# Patient Record
Sex: FEMALE | Race: WHITE | NOT HISPANIC OR LATINO | Employment: OTHER | URBAN - NONMETROPOLITAN AREA
[De-identification: names, ages, dates, MRNs, and addresses within clinical notes are randomized per-mention and may not be internally consistent; named-entity substitution may affect disease eponyms.]

---

## 2023-03-18 DIAGNOSIS — I10 PRIMARY HYPERTENSION: Primary | ICD-10-CM

## 2023-03-21 RX ORDER — HYDROCHLOROTHIAZIDE 12.5 MG/1
TABLET ORAL
Qty: 90 TABLET | Refills: 3 | Status: SHIPPED | OUTPATIENT
Start: 2023-03-21 | End: 2024-01-18 | Stop reason: SDUPTHER

## 2023-07-11 PROBLEM — I10 HYPERTENSION, ESSENTIAL, BENIGN: Status: ACTIVE | Noted: 2023-07-11

## 2023-07-11 PROBLEM — R12 HEARTBURN: Status: ACTIVE | Noted: 2023-07-11

## 2023-07-11 PROBLEM — E78.00 HYPERCHOLESTEROLEMIA: Status: ACTIVE | Noted: 2023-07-11

## 2023-07-11 PROBLEM — M81.0 OSTEOPOROSIS: Status: ACTIVE | Noted: 2023-07-11

## 2023-07-11 PROBLEM — R00.1 BRADYCARDIA: Status: ACTIVE | Noted: 2023-07-11

## 2023-07-11 RX ORDER — MULTIVIT-MIN/FERROUS FUMARATE 15 MG
1 TABLET ORAL DAILY
COMMUNITY
Start: 2020-12-17

## 2023-07-11 RX ORDER — PITAVASTATIN MAGNESIUM 2 MG/1
1 TABLET, FILM COATED ORAL DAILY
COMMUNITY
Start: 2022-07-08 | End: 2023-07-13 | Stop reason: WASHOUT

## 2023-07-11 RX ORDER — DENOSUMAB 60 MG/ML
INJECTION SUBCUTANEOUS
COMMUNITY
Start: 2020-01-17

## 2023-07-11 RX ORDER — EVOLOCUMAB 140 MG/ML
140 INJECTION, SOLUTION SUBCUTANEOUS
COMMUNITY
Start: 2023-06-24 | End: 2024-01-22

## 2023-07-11 RX ORDER — LISINOPRIL 20 MG/1
1 TABLET ORAL DAILY
COMMUNITY
Start: 2020-01-17 | End: 2023-12-22 | Stop reason: SDUPTHER

## 2023-07-13 ENCOUNTER — OFFICE VISIT (OUTPATIENT)
Dept: PRIMARY CARE | Facility: CLINIC | Age: 68
End: 2023-07-13
Payer: MEDICARE

## 2023-07-13 VITALS
OXYGEN SATURATION: 96 % | DIASTOLIC BLOOD PRESSURE: 80 MMHG | SYSTOLIC BLOOD PRESSURE: 130 MMHG | HEIGHT: 62 IN | HEART RATE: 59 BPM | BODY MASS INDEX: 28.93 KG/M2 | WEIGHT: 157.2 LBS

## 2023-07-13 DIAGNOSIS — R53.82 CHRONIC FATIGUE: ICD-10-CM

## 2023-07-13 DIAGNOSIS — Z12.31 BREAST CANCER SCREENING BY MAMMOGRAM: ICD-10-CM

## 2023-07-13 DIAGNOSIS — M81.0 AGE-RELATED OSTEOPOROSIS WITHOUT CURRENT PATHOLOGICAL FRACTURE: ICD-10-CM

## 2023-07-13 DIAGNOSIS — I10 HYPERTENSION, ESSENTIAL, BENIGN: ICD-10-CM

## 2023-07-13 DIAGNOSIS — E78.00 HYPERCHOLESTEROLEMIA: Primary | ICD-10-CM

## 2023-07-13 PROCEDURE — 1159F MED LIST DOCD IN RCRD: CPT | Performed by: FAMILY MEDICINE

## 2023-07-13 PROCEDURE — 1160F RVW MEDS BY RX/DR IN RCRD: CPT | Performed by: FAMILY MEDICINE

## 2023-07-13 PROCEDURE — 3075F SYST BP GE 130 - 139MM HG: CPT | Performed by: FAMILY MEDICINE

## 2023-07-13 PROCEDURE — 3079F DIAST BP 80-89 MM HG: CPT | Performed by: FAMILY MEDICINE

## 2023-07-13 PROCEDURE — 99214 OFFICE O/P EST MOD 30 MIN: CPT | Performed by: FAMILY MEDICINE

## 2023-07-13 PROCEDURE — 1036F TOBACCO NON-USER: CPT | Performed by: FAMILY MEDICINE

## 2023-07-13 ASSESSMENT — ENCOUNTER SYMPTOMS
OCCASIONAL FEELINGS OF UNSTEADINESS: 0
DIARRHEA: 0
CONSTIPATION: 0
NUMBNESS: 0
TROUBLE SWALLOWING: 0
LIGHT-HEADEDNESS: 0
NAUSEA: 0
UNEXPECTED WEIGHT CHANGE: 0
SHORTNESS OF BREATH: 0
APPETITE CHANGE: 0
DIZZINESS: 0
PALPITATIONS: 0
ACTIVITY CHANGE: 0
DEPRESSION: 0
COUGH: 0
ADENOPATHY: 0
WHEEZING: 0
LOSS OF SENSATION IN FEET: 0
FATIGUE: 1
ARTHRALGIAS: 0
ABDOMINAL PAIN: 0
DIFFICULTY URINATING: 0
VOMITING: 0
HEADACHES: 0
RHINORRHEA: 0
ABDOMINAL DISTENTION: 0

## 2023-07-13 ASSESSMENT — PATIENT HEALTH QUESTIONNAIRE - PHQ9
1. LITTLE INTEREST OR PLEASURE IN DOING THINGS: NOT AT ALL
SUM OF ALL RESPONSES TO PHQ9 QUESTIONS 1 AND 2: 0
2. FEELING DOWN, DEPRESSED OR HOPELESS: NOT AT ALL

## 2023-07-13 NOTE — ASSESSMENT & PLAN NOTE
Blood pressure under good control renal function stable, no changes medication.   Call to patient.  Informed of Dr. Rivas's recommendation for Renal Ultrasound.  Patient to continue comfort measures including Tylenol and heat packs.     Allyson GUDINO RN  For Dr. Rivas (on call for Caren)

## 2023-07-13 NOTE — PROGRESS NOTES
"Subjective   Patient ID: Paige Denson is a 67 y.o. female who presents for 6 MO LABS.    HPI   No headache, chest pain, shortness of breath, dizziness, lightheadedness, or edema  Chronic fatigue, tired after injection of Repatha  Had Prolia at infusion center, last injection in May    Review of Systems   Constitutional:  Positive for fatigue. Negative for activity change, appetite change and unexpected weight change.   HENT:  Negative for ear pain, nosebleeds, rhinorrhea, sneezing and trouble swallowing.    Respiratory:  Negative for cough, shortness of breath and wheezing.    Cardiovascular:  Negative for chest pain, palpitations and leg swelling.   Gastrointestinal:  Negative for abdominal distention, abdominal pain, constipation, diarrhea, nausea and vomiting.   Genitourinary:  Negative for difficulty urinating.   Musculoskeletal:  Negative for arthralgias.   Skin:  Negative for rash.   Neurological:  Negative for dizziness, light-headedness, numbness and headaches.   Hematological:  Negative for adenopathy.   Psychiatric/Behavioral:  Negative for behavioral problems.    All other systems reviewed and are negative.      Objective   /80   Pulse 59   Ht 1.575 m (5' 2\")   Wt 71.3 kg (157 lb 3.2 oz)   SpO2 96%   BMI 28.75 kg/m²     Physical Exam  Vitals and nursing note reviewed.   Constitutional:       Appearance: Normal appearance.   HENT:      Head: Normocephalic and atraumatic.      Right Ear: Tympanic membrane, ear canal and external ear normal.      Left Ear: Tympanic membrane, ear canal and external ear normal.      Nose: Nose normal.      Mouth/Throat:      Mouth: Mucous membranes are moist.      Pharynx: Oropharynx is clear.   Cardiovascular:      Rate and Rhythm: Normal rate and regular rhythm.      Pulses: Normal pulses.      Heart sounds: Normal heart sounds.   Pulmonary:      Effort: Pulmonary effort is normal.      Breath sounds: Normal breath sounds.   Musculoskeletal:      Cervical " back: Normal range of motion and neck supple.   Neurological:      Mental Status: She is alert.   Psychiatric:         Mood and Affect: Mood normal.         Behavior: Behavior normal.         Assessment/Plan   Problem List Items Addressed This Visit       Hypercholesterolemia - Primary     Patient intolerant to multiple statins in the past, tolerating Repatha injections, LDL cholesterol now below 100.  Continue with current medication plan to recheck again in 6 months.         Relevant Orders    Follow Up In Primary Care - Established    Comprehensive Metabolic Panel    Lipid Panel    Hypertension, essential, benign     Blood pressure under good control renal function stable, no changes medication.         Relevant Orders    Follow Up In Primary Care - Established    Comprehensive Metabolic Panel    Osteoporosis     Tolerating Prolia infusions, will check calcium and vitamin D at next office visit.         Relevant Orders    Follow Up In Primary Care - Established    Vitamin D 25-Hydroxy,Total    XR DEXA bone density     Other Visit Diagnoses       Breast cancer screening by mammogram        Relevant Orders    Follow Up In Primary Care - Established    BI mammo bilateral screening tomosynthesis    Chronic fatigue        Check additional labs at next office visit, encouraged to be active.    Relevant Orders    Follow Up In Primary Care - Established    CBC    Comprehensive Metabolic Panel    Vitamin B12    Thyroid Stimulating Hormone    Vitamin D 25-Hydroxy,Total

## 2023-07-13 NOTE — ASSESSMENT & PLAN NOTE
Patient intolerant to multiple statins in the past, tolerating Repatha injections, LDL cholesterol now below 100.  Continue with current medication plan to recheck again in 6 months.

## 2023-10-11 ENCOUNTER — APPOINTMENT (OUTPATIENT)
Dept: RADIOLOGY | Facility: CLINIC | Age: 68
End: 2023-10-11
Payer: MEDICARE

## 2023-10-30 ENCOUNTER — OFFICE VISIT (OUTPATIENT)
Dept: PRIMARY CARE | Facility: CLINIC | Age: 68
End: 2023-10-30
Payer: MEDICARE

## 2023-10-30 VITALS
DIASTOLIC BLOOD PRESSURE: 68 MMHG | HEART RATE: 84 BPM | OXYGEN SATURATION: 97 % | SYSTOLIC BLOOD PRESSURE: 118 MMHG | BODY MASS INDEX: 29.21 KG/M2 | HEIGHT: 62 IN | WEIGHT: 158.7 LBS

## 2023-10-30 DIAGNOSIS — R10.32 LLQ PAIN: Primary | ICD-10-CM

## 2023-10-30 PROCEDURE — 99213 OFFICE O/P EST LOW 20 MIN: CPT | Performed by: NURSE PRACTITIONER

## 2023-10-30 PROCEDURE — 1036F TOBACCO NON-USER: CPT | Performed by: NURSE PRACTITIONER

## 2023-10-30 PROCEDURE — 1160F RVW MEDS BY RX/DR IN RCRD: CPT | Performed by: NURSE PRACTITIONER

## 2023-10-30 PROCEDURE — 3078F DIAST BP <80 MM HG: CPT | Performed by: NURSE PRACTITIONER

## 2023-10-30 PROCEDURE — 3074F SYST BP LT 130 MM HG: CPT | Performed by: NURSE PRACTITIONER

## 2023-10-30 PROCEDURE — 1159F MED LIST DOCD IN RCRD: CPT | Performed by: NURSE PRACTITIONER

## 2023-10-30 RX ORDER — ERGOCALCIFEROL 1.25 MG/1
CAPSULE ORAL
COMMUNITY
Start: 2020-01-17

## 2023-10-30 RX ORDER — AMOXICILLIN AND CLAVULANATE POTASSIUM 875; 125 MG/1; MG/1
875 TABLET, FILM COATED ORAL
Qty: 20 TABLET | Refills: 0 | Status: SHIPPED | OUTPATIENT
Start: 2023-10-30 | End: 2023-11-09

## 2023-10-30 ASSESSMENT — ENCOUNTER SYMPTOMS
BLOOD IN STOOL: 0
NAUSEA: 0
ANAL BLEEDING: 0
FATIGUE: 0
VOMITING: 0
ABDOMINAL PAIN: 1
RECTAL PAIN: 0
DIARRHEA: 0
CHILLS: 0
CONSTIPATION: 0
FEVER: 0
ABDOMINAL DISTENTION: 0

## 2023-10-30 ASSESSMENT — PATIENT HEALTH QUESTIONNAIRE - PHQ9
SUM OF ALL RESPONSES TO PHQ9 QUESTIONS 1 AND 2: 0
1. LITTLE INTEREST OR PLEASURE IN DOING THINGS: NOT AT ALL
2. FEELING DOWN, DEPRESSED OR HOPELESS: NOT AT ALL

## 2023-10-30 NOTE — PROGRESS NOTES
"Subjective   Patient ID: Radha Denson is a 68 y.o. female who presents for LLQ PAIN (X WEDNESDAY 10/25, DENIES URINARY SYMPTOMS ).    Radha comes to the office for c/o LLQ pain x5 D that is intermittent in nature and described as a pressure sensation.  She endorses no  symptoms.  No melena/nausea/vomiting/diarrhea/fever/chilling or constipation.  States bowels are moving as per their usual.  Appetite is well.  No food triggers.  States 8 greasy meal on the Sunday prior to the pain starting.  Last colonoscopy 2019 showed multiple small and large mouth diverticula in the sigmoid and ascending colon.  Repeat colonoscopy in 5 years.         Review of Systems   Constitutional:  Negative for chills, fatigue and fever.   Gastrointestinal:  Positive for abdominal pain. Negative for abdominal distention, anal bleeding, blood in stool, constipation, diarrhea, nausea, rectal pain and vomiting.       Objective   /68   Pulse 84   Ht 1.575 m (5' 2\")   Wt 72 kg (158 lb 11.2 oz)   SpO2 97%   BMI 29.03 kg/m²     Physical Exam  Vitals reviewed.   Constitutional:       General: She is not in acute distress.     Appearance: She is obese. She is not ill-appearing.   Cardiovascular:      Rate and Rhythm: Normal rate and regular rhythm.   Pulmonary:      Effort: Pulmonary effort is normal.      Breath sounds: Normal breath sounds.   Abdominal:      General: Abdomen is protuberant. Bowel sounds are decreased.      Palpations: Abdomen is soft.      Tenderness: There is abdominal tenderness in the left lower quadrant.   Neurological:      Mental Status: She is alert.   Psychiatric:         Behavior: Behavior is cooperative.         Assessment/Plan   Problem List Items Addressed This Visit             ICD-10-CM    LLQ pain - Primary R10.32    Relevant Medications    amoxicillin-pot clavulanate (Augmentin) 875-125 mg tablet     1. LLQ pain  amoxicillin-pot clavulanate (Augmentin) 875-125 mg tablet    Augmentin 1 tablet twice a day " x10 days; phone update on Wednesday

## 2023-11-01 ENCOUNTER — TELEPHONE (OUTPATIENT)
Dept: PRIMARY CARE | Facility: CLINIC | Age: 68
End: 2023-11-01
Payer: MEDICARE

## 2023-11-01 NOTE — TELEPHONE ENCOUNTER
Contacted patient for follow up, she states she is feeling much better still having slight pain with first void in morning. Instructed her to call back with any issues.

## 2023-11-01 NOTE — TELEPHONE ENCOUNTER
----- Message from WILLY Jurado sent at 11/1/2023  8:01 AM EDT -----  Please call & get an update on how patient is feeling. Seen on Mon for LLQ & started on antibiotic.  Thanks  daniel

## 2023-11-22 ENCOUNTER — TELEPHONE (OUTPATIENT)
Dept: PRIMARY CARE | Facility: CLINIC | Age: 68
End: 2023-11-22
Payer: MEDICARE

## 2023-11-22 DIAGNOSIS — R10.32 LLQ PAIN: Primary | ICD-10-CM

## 2023-11-22 NOTE — TELEPHONE ENCOUNTER
Patient called stating her side pain has come back after completing antibiotic, requesting more testing.   
Pt notified   
complains of pain/discomfort

## 2023-11-29 ENCOUNTER — TELEPHONE (OUTPATIENT)
Dept: PRIMARY CARE | Facility: CLINIC | Age: 68
End: 2023-11-29
Payer: MEDICARE

## 2023-11-29 NOTE — TELEPHONE ENCOUNTER
----- Message from WILLY Jurado sent at 11/29/2023  1:00 PM EST -----  Regarding: lab results  Please call patient that her blood work is normal.

## 2023-12-06 ENCOUNTER — HOSPITAL ENCOUNTER (OUTPATIENT)
Dept: RADIOLOGY | Facility: HOSPITAL | Age: 68
Discharge: HOME | End: 2023-12-06
Payer: MEDICARE

## 2023-12-06 DIAGNOSIS — R10.32 LLQ PAIN: ICD-10-CM

## 2023-12-06 PROCEDURE — A9698 NON-RAD CONTRAST MATERIALNOC: HCPCS | Performed by: NURSE PRACTITIONER

## 2023-12-06 PROCEDURE — 2550000001 HC RX 255 CONTRASTS: Performed by: NURSE PRACTITIONER

## 2023-12-06 PROCEDURE — 74177 CT ABD & PELVIS W/CONTRAST: CPT | Performed by: STUDENT IN AN ORGANIZED HEALTH CARE EDUCATION/TRAINING PROGRAM

## 2023-12-06 PROCEDURE — 74177 CT ABD & PELVIS W/CONTRAST: CPT

## 2023-12-06 RX ADMIN — IOHEXOL 72 ML: 350 INJECTION, SOLUTION INTRAVENOUS at 11:54

## 2023-12-06 RX ADMIN — IOHEXOL 500 ML: 12 SOLUTION ORAL at 11:53

## 2023-12-07 NOTE — RESULT ENCOUNTER NOTE
Called and spoke with patient this morning regarding blood work and CT of the abdomen and pelvis.  Patient has no further left lower quadrant pain.  Bowels are per as usual and her appetite is well.  Will continue to monitor at this time.  Instructed to reach out to the office if she has any further pain in that left lower quadrant or any other issues in the interim.  Patient aware and voices understanding.

## 2023-12-22 DIAGNOSIS — R12 HEARTBURN: Primary | ICD-10-CM

## 2023-12-22 RX ORDER — LISINOPRIL 20 MG/1
20 TABLET ORAL DAILY
Qty: 90 TABLET | Refills: 3 | Status: SHIPPED | OUTPATIENT
Start: 2023-12-22 | End: 2024-01-18 | Stop reason: SDUPTHER

## 2024-01-18 ENCOUNTER — OFFICE VISIT (OUTPATIENT)
Dept: PRIMARY CARE | Facility: CLINIC | Age: 69
End: 2024-01-18
Payer: MEDICARE

## 2024-01-18 VITALS
OXYGEN SATURATION: 94 % | WEIGHT: 158.7 LBS | BODY MASS INDEX: 29.21 KG/M2 | DIASTOLIC BLOOD PRESSURE: 80 MMHG | SYSTOLIC BLOOD PRESSURE: 118 MMHG | HEART RATE: 77 BPM | HEIGHT: 62 IN

## 2024-01-18 DIAGNOSIS — Z00.00 ROUTINE GENERAL MEDICAL EXAMINATION AT HEALTH CARE FACILITY: Primary | ICD-10-CM

## 2024-01-18 DIAGNOSIS — I10 HYPERTENSION, ESSENTIAL, BENIGN: ICD-10-CM

## 2024-01-18 DIAGNOSIS — R12 HEARTBURN: ICD-10-CM

## 2024-01-18 DIAGNOSIS — M81.0 AGE-RELATED OSTEOPOROSIS WITHOUT CURRENT PATHOLOGICAL FRACTURE: ICD-10-CM

## 2024-01-18 DIAGNOSIS — E53.8 B12 DEFICIENCY: ICD-10-CM

## 2024-01-18 DIAGNOSIS — I10 PRIMARY HYPERTENSION: ICD-10-CM

## 2024-01-18 DIAGNOSIS — E55.9 VITAMIN D DEFICIENCY: ICD-10-CM

## 2024-01-18 DIAGNOSIS — E78.00 HYPERCHOLESTEROLEMIA: ICD-10-CM

## 2024-01-18 PROCEDURE — G0439 PPPS, SUBSEQ VISIT: HCPCS | Performed by: FAMILY MEDICINE

## 2024-01-18 PROCEDURE — 1159F MED LIST DOCD IN RCRD: CPT | Performed by: FAMILY MEDICINE

## 2024-01-18 PROCEDURE — 3074F SYST BP LT 130 MM HG: CPT | Performed by: FAMILY MEDICINE

## 2024-01-18 PROCEDURE — 1170F FXNL STATUS ASSESSED: CPT | Performed by: FAMILY MEDICINE

## 2024-01-18 PROCEDURE — 3079F DIAST BP 80-89 MM HG: CPT | Performed by: FAMILY MEDICINE

## 2024-01-18 PROCEDURE — 99214 OFFICE O/P EST MOD 30 MIN: CPT | Performed by: FAMILY MEDICINE

## 2024-01-18 PROCEDURE — 1160F RVW MEDS BY RX/DR IN RCRD: CPT | Performed by: FAMILY MEDICINE

## 2024-01-18 PROCEDURE — 1036F TOBACCO NON-USER: CPT | Performed by: FAMILY MEDICINE

## 2024-01-18 RX ORDER — HYDROCHLOROTHIAZIDE 12.5 MG/1
12.5 TABLET ORAL
Qty: 90 TABLET | Refills: 3 | Status: SHIPPED | OUTPATIENT
Start: 2024-01-18 | End: 2025-01-17

## 2024-01-18 RX ORDER — LISINOPRIL 20 MG/1
20 TABLET ORAL DAILY
Qty: 90 TABLET | Refills: 3 | Status: SHIPPED | OUTPATIENT
Start: 2024-01-18 | End: 2025-01-17

## 2024-01-18 ASSESSMENT — ACTIVITIES OF DAILY LIVING (ADL)
DRESSING: INDEPENDENT
GROCERY_SHOPPING: INDEPENDENT
TAKING_MEDICATION: INDEPENDENT
BATHING: INDEPENDENT
DOING_HOUSEWORK: INDEPENDENT
MANAGING_FINANCES: INDEPENDENT

## 2024-01-18 ASSESSMENT — ENCOUNTER SYMPTOMS
DIZZINESS: 0
DIFFICULTY URINATING: 0
WHEEZING: 0
NAUSEA: 0
LOSS OF SENSATION IN FEET: 0
ADENOPATHY: 0
VOMITING: 0
HEADACHES: 0
LIGHT-HEADEDNESS: 0
SHORTNESS OF BREATH: 0
COUGH: 0
PALPITATIONS: 0
OCCASIONAL FEELINGS OF UNSTEADINESS: 0
ABDOMINAL DISTENTION: 0
NUMBNESS: 0
FATIGUE: 0
TROUBLE SWALLOWING: 0
ARTHRALGIAS: 0
RHINORRHEA: 0
DIARRHEA: 0
APPETITE CHANGE: 0
ABDOMINAL PAIN: 0
DEPRESSION: 0
UNEXPECTED WEIGHT CHANGE: 0
CONSTIPATION: 0
ACTIVITY CHANGE: 0

## 2024-01-18 ASSESSMENT — PATIENT HEALTH QUESTIONNAIRE - PHQ9
1. LITTLE INTEREST OR PLEASURE IN DOING THINGS: NOT AT ALL
2. FEELING DOWN, DEPRESSED OR HOPELESS: NOT AT ALL
SUM OF ALL RESPONSES TO PHQ9 QUESTIONS 1 AND 2: 0

## 2024-01-18 NOTE — ASSESSMENT & PLAN NOTE
Vitamin D level elevated, told to make sure that she is taking only 2000 international units of vitamin D, continue with calcium supplementation, will be due for bone density test this summer, patient electing to do this through her gynecologist and [place].  Patient had a bad experience with her Prolia infusion at [facility], is now transitioning that back to her gynecologist office in [place].

## 2024-01-18 NOTE — PROGRESS NOTES
"Subjective   Reason for Visit: Paige Denson is an 68 y.o. female here for a Medicare Wellness visit.     Past Medical, Surgical, and Family History reviewed and updated in chart.    Reviewed all medications by prescribing practitioner or clinical pharmacist (such as prescriptions, OTCs, herbal therapies and supplements) and documented in the medical record.    HPI  No headache, chest pain, shortness of breath, dizziness, lightheadedness, or edema  Getting financial help for Repatha, had issue with billing for Prolia infusion at       Patient Care Team:  Avery Cheema MD as PCP - General (Family Medicine)  Avery Cheema MD as PCP - [employer] PCP     Review of Systems   Constitutional:  Negative for activity change, appetite change, fatigue and unexpected weight change.   HENT:  Negative for ear pain, nosebleeds, rhinorrhea, sneezing and trouble swallowing.    Respiratory:  Negative for cough, shortness of breath and wheezing.    Cardiovascular:  Negative for chest pain, palpitations and leg swelling.   Gastrointestinal:  Negative for abdominal distention, abdominal pain, constipation, diarrhea, nausea and vomiting.   Genitourinary:  Negative for difficulty urinating.   Musculoskeletal:  Negative for arthralgias.   Skin:  Negative for rash.   Neurological:  Negative for dizziness, light-headedness, numbness and headaches.   Hematological:  Negative for adenopathy.   Psychiatric/Behavioral:  Negative for behavioral problems.    All other systems reviewed and are negative.      Objective   Vitals:  /80   Pulse 77   Ht 1.575 m (5' 2\")   Wt 72 kg (158 lb 11.2 oz)   SpO2 94%   BMI 29.03 kg/m²       Physical Exam  Vitals and nursing note reviewed.   Constitutional:       Appearance: Normal appearance.   HENT:      Head: Normocephalic and atraumatic.      Right Ear: Tympanic membrane, ear canal and external ear normal.      Left Ear: Tympanic membrane, ear canal and external ear " normal.      Nose: Nose normal.      Mouth/Throat:      Mouth: Mucous membranes are moist.      Pharynx: Oropharynx is clear.   Cardiovascular:      Rate and Rhythm: Normal rate and regular rhythm.      Pulses: Normal pulses.      Heart sounds: Normal heart sounds.   Pulmonary:      Effort: Pulmonary effort is normal.      Breath sounds: Normal breath sounds.   Musculoskeletal:      Cervical back: Normal range of motion and neck supple.   Neurological:      Mental Status: She is alert.   Psychiatric:         Mood and Affect: Mood normal.         Behavior: Behavior normal.         Assessment/Plan   Problem List Items Addressed This Visit       RESOLVED: Heartburn    Relevant Medications    lisinopril 20 mg tablet    Hypercholesterolemia    Current Assessment & Plan     Tolerating Repatha injections, LDL cholesterol now at goal below 75, seems to be tolerating medication well.         Relevant Orders    Follow Up In Primary Care - Established    Comprehensive Metabolic Panel    Cholesterol, LDL Direct    Primary hypertension    Current Assessment & Plan     Blood pressure under good control renal function stable, no change with medication.         Relevant Medications    hydroCHLOROthiazide (HYDRODiuril) 12.5 mg tablet    Osteoporosis    Current Assessment & Plan     Vitamin D level elevated, told to make sure that she is taking only 2000 international units of vitamin D, continue with calcium supplementation, will be due for bone density test this summer, patient electing to do this through her gynecologist and [place].  Patient had a bad experience with her Prolia infusion at [facility], is now transitioning that back to her gynecologist office in [place].         Relevant Orders    Follow Up In Primary Care - Established    B12 deficiency    Relevant Orders    Follow Up In Primary Care - Established    Vitamin B12    Vitamin D deficiency    Relevant Orders    Follow Up In Primary Care - Established     Vitamin D 25-Hydroxy,Total (for eval of Vitamin D levels)     Other Visit Diagnoses       Routine general medical examination at health care facility    -  Primary    Relevant Orders    Follow Up In Primary Care - Established

## 2024-01-18 NOTE — ASSESSMENT & PLAN NOTE
Tolerating Repatha injections, LDL cholesterol now at goal below 75, seems to be tolerating medication well.

## 2024-01-22 DIAGNOSIS — E78.00 HYPERCHOLESTEROLEMIA: Primary | ICD-10-CM

## 2024-01-22 DIAGNOSIS — E78.00 HYPERCHOLESTEROLEMIA: ICD-10-CM

## 2024-01-22 RX ORDER — EVOLOCUMAB 140 MG/ML
INJECTION, SOLUTION SUBCUTANEOUS
Qty: 2 ML | Refills: 11 | Status: SHIPPED | OUTPATIENT
Start: 2024-01-22 | End: 2024-01-22 | Stop reason: SDUPTHER

## 2024-01-22 RX ORDER — EVOLOCUMAB 140 MG/ML
140 INJECTION, SOLUTION SUBCUTANEOUS
Qty: 2 ML | Refills: 11 | Status: SHIPPED | OUTPATIENT
Start: 2024-01-22 | End: 2025-01-21

## 2024-02-05 ENCOUNTER — OFFICE VISIT (OUTPATIENT)
Dept: PRIMARY CARE | Facility: CLINIC | Age: 69
End: 2024-02-05
Payer: MEDICARE

## 2024-02-05 VITALS
HEART RATE: 73 BPM | WEIGHT: 158 LBS | BODY MASS INDEX: 29.08 KG/M2 | OXYGEN SATURATION: 96 % | SYSTOLIC BLOOD PRESSURE: 142 MMHG | HEIGHT: 62 IN | DIASTOLIC BLOOD PRESSURE: 86 MMHG

## 2024-02-05 DIAGNOSIS — R05.1 ACUTE COUGH: Primary | ICD-10-CM

## 2024-02-05 PROCEDURE — 1036F TOBACCO NON-USER: CPT | Performed by: FAMILY MEDICINE

## 2024-02-05 PROCEDURE — 3079F DIAST BP 80-89 MM HG: CPT | Performed by: FAMILY MEDICINE

## 2024-02-05 PROCEDURE — 99213 OFFICE O/P EST LOW 20 MIN: CPT | Performed by: FAMILY MEDICINE

## 2024-02-05 PROCEDURE — 1159F MED LIST DOCD IN RCRD: CPT | Performed by: FAMILY MEDICINE

## 2024-02-05 PROCEDURE — 3077F SYST BP >= 140 MM HG: CPT | Performed by: FAMILY MEDICINE

## 2024-02-05 PROCEDURE — 1160F RVW MEDS BY RX/DR IN RCRD: CPT | Performed by: FAMILY MEDICINE

## 2024-02-05 RX ORDER — PREDNISONE 20 MG/1
40 TABLET ORAL DAILY
Qty: 10 TABLET | Refills: 0 | Status: SHIPPED | OUTPATIENT
Start: 2024-02-05 | End: 2024-02-10

## 2024-02-05 RX ORDER — BENZONATATE 100 MG/1
100 CAPSULE ORAL 3 TIMES DAILY PRN
Qty: 42 CAPSULE | Refills: 1 | Status: SHIPPED | OUTPATIENT
Start: 2024-02-05 | End: 2024-03-06

## 2024-02-05 ASSESSMENT — PATIENT HEALTH QUESTIONNAIRE - PHQ9
SUM OF ALL RESPONSES TO PHQ9 QUESTIONS 1 AND 2: 0
2. FEELING DOWN, DEPRESSED OR HOPELESS: NOT AT ALL
1. LITTLE INTEREST OR PLEASURE IN DOING THINGS: NOT AT ALL

## 2024-02-05 ASSESSMENT — ENCOUNTER SYMPTOMS
MYALGIAS: 0
HEARTBURN: 0
SWEATS: 0
WHEEZING: 1
RHINORRHEA: 1
WEIGHT LOSS: 0
CHILLS: 0
HEADACHES: 0
FEVER: 0
SHORTNESS OF BREATH: 0
HEMOPTYSIS: 0
SORE THROAT: 0
COUGH: 1

## 2024-02-05 NOTE — PROGRESS NOTES
"Subjective   Patient ID: Radha Denson is a 68 y.o. female who presents for Cough (X3days/CONGESTION AND FATIGUE).    Cough  This is a new problem. The current episode started in the past 7 days. The problem has been waxing and waning. The problem occurs every few hours. The cough is Non-productive. Associated symptoms include ear congestion, nasal congestion, postnasal drip, rhinorrhea and wheezing. Pertinent negatives include no chest pain, chills, ear pain, fever, headaches, heartburn, hemoptysis, myalgias, rash, sore throat, shortness of breath, sweats or weight loss. The symptoms are aggravated by lying down.      Cough for about 5 to 7 days.  Is coughing up some stuff but it is not getting any worse.  Main thing is since is not getting any better.  Grandson is the ill contact, and he is frequently sick.  She is not sure about wanting to use the cough syrup so we will hold off.  Will try the Tessalon cough pills  Prednisone for 5 days    Review of Systems   Constitutional:  Negative for chills, fever and weight loss.   HENT:  Positive for postnasal drip and rhinorrhea. Negative for ear pain and sore throat.    Respiratory:  Positive for cough and wheezing. Negative for hemoptysis and shortness of breath.    Cardiovascular:  Negative for chest pain.   Gastrointestinal:  Negative for heartburn.   Musculoskeletal:  Negative for myalgias.   Skin:  Negative for rash.   Neurological:  Negative for headaches.       Objective   /86   Pulse 73   Ht 1.575 m (5' 2\")   Wt 71.7 kg (158 lb)   SpO2 96%   BMI 28.90 kg/m²     Physical Exam  Constitutional:       Appearance: Normal appearance.   HENT:      Head: Normocephalic and atraumatic.      Right Ear: Tympanic membrane, ear canal and external ear normal.      Left Ear: Tympanic membrane, ear canal and external ear normal.   Cardiovascular:      Rate and Rhythm: Normal rate and regular rhythm.      Heart sounds: Normal heart sounds.   Pulmonary:      Effort: " Pulmonary effort is normal.      Breath sounds: Normal breath sounds.   Skin:     General: Skin is warm and dry.   Neurological:      General: No focal deficit present.      Mental Status: She is alert and oriented to person, place, and time.   Psychiatric:         Mood and Affect: Mood normal.         Behavior: Behavior normal.         Thought Content: Thought content normal.         Judgment: Judgment normal.         Assessment/Plan   Problem List Items Addressed This Visit    None  Visit Diagnoses         Codes    Acute cough    -  Primary R05.1    Relevant Medications    predniSONE (Deltasone) 20 mg tablet    benzonatate (Tessalon) 100 mg capsule

## 2024-02-07 ENCOUNTER — TELEPHONE (OUTPATIENT)
Dept: PRIMARY CARE | Facility: CLINIC | Age: 69
End: 2024-02-07

## 2024-02-07 ENCOUNTER — APPOINTMENT (OUTPATIENT)
Dept: PRIMARY CARE | Facility: CLINIC | Age: 69
End: 2024-02-07
Payer: MEDICARE

## 2024-02-07 NOTE — TELEPHONE ENCOUNTER
YE pt calling in stating prednisone is keeping her up all night, making her over active, and either the cough medicine or prednisone is making her face red.  Yesterday she only took 1 prednisone tab and still was awake. Please advise.

## 2024-02-08 ENCOUNTER — TELEPHONE (OUTPATIENT)
Dept: PRIMARY CARE | Facility: CLINIC | Age: 69
End: 2024-02-08
Payer: MEDICARE

## 2024-02-08 DIAGNOSIS — R05.1 ACUTE COUGH: Primary | ICD-10-CM

## 2024-02-08 RX ORDER — AZITHROMYCIN 250 MG/1
TABLET, FILM COATED ORAL
Qty: 6 TABLET | Refills: 0 | Status: SHIPPED | OUTPATIENT
Start: 2024-02-08 | End: 2024-02-12

## 2024-02-08 NOTE — TELEPHONE ENCOUNTER
CALLING IN REPORTING NOT GETTING ANY BETTER.  STATES SHE IS COUGHING AND BRINGING UP YELLOW.  COUGH PILLS ARE NOT WORKING, STATES SHE IS TAKING OTC COUGH SYRUP TOO.  IS ASKING FOR SOMETHING ELSE.  PLEASE ADVISE.

## 2024-03-20 DIAGNOSIS — I10 PRIMARY HYPERTENSION: ICD-10-CM

## 2024-07-19 ENCOUNTER — APPOINTMENT (OUTPATIENT)
Dept: PRIMARY CARE | Facility: CLINIC | Age: 69
End: 2024-07-19
Payer: MEDICARE

## 2024-07-19 VITALS
HEIGHT: 62 IN | BODY MASS INDEX: 29.43 KG/M2 | DIASTOLIC BLOOD PRESSURE: 80 MMHG | SYSTOLIC BLOOD PRESSURE: 130 MMHG | WEIGHT: 159.9 LBS | OXYGEN SATURATION: 95 % | HEART RATE: 64 BPM

## 2024-07-19 DIAGNOSIS — E53.8 B12 DEFICIENCY: ICD-10-CM

## 2024-07-19 DIAGNOSIS — I10 HYPERTENSION, ESSENTIAL, BENIGN: ICD-10-CM

## 2024-07-19 DIAGNOSIS — Z00.00 ROUTINE GENERAL MEDICAL EXAMINATION AT HEALTH CARE FACILITY: ICD-10-CM

## 2024-07-19 DIAGNOSIS — M81.0 AGE-RELATED OSTEOPOROSIS WITHOUT CURRENT PATHOLOGICAL FRACTURE: ICD-10-CM

## 2024-07-19 DIAGNOSIS — E55.9 VITAMIN D DEFICIENCY: ICD-10-CM

## 2024-07-19 DIAGNOSIS — E78.00 HYPERCHOLESTEROLEMIA: ICD-10-CM

## 2024-07-19 DIAGNOSIS — I10 PRIMARY HYPERTENSION: Primary | ICD-10-CM

## 2024-07-19 PROBLEM — R10.32 LLQ PAIN: Status: RESOLVED | Noted: 2023-10-30 | Resolved: 2024-07-19

## 2024-07-19 PROCEDURE — 1036F TOBACCO NON-USER: CPT | Performed by: FAMILY MEDICINE

## 2024-07-19 PROCEDURE — 1160F RVW MEDS BY RX/DR IN RCRD: CPT | Performed by: FAMILY MEDICINE

## 2024-07-19 PROCEDURE — 3075F SYST BP GE 130 - 139MM HG: CPT | Performed by: FAMILY MEDICINE

## 2024-07-19 PROCEDURE — 3079F DIAST BP 80-89 MM HG: CPT | Performed by: FAMILY MEDICINE

## 2024-07-19 PROCEDURE — 99213 OFFICE O/P EST LOW 20 MIN: CPT | Performed by: FAMILY MEDICINE

## 2024-07-19 PROCEDURE — 3008F BODY MASS INDEX DOCD: CPT | Performed by: FAMILY MEDICINE

## 2024-07-19 PROCEDURE — 1159F MED LIST DOCD IN RCRD: CPT | Performed by: FAMILY MEDICINE

## 2024-07-19 ASSESSMENT — ENCOUNTER SYMPTOMS
NAUSEA: 0
HEADACHES: 0
UNEXPECTED WEIGHT CHANGE: 0
ABDOMINAL PAIN: 0
APPETITE CHANGE: 0
RHINORRHEA: 0
NUMBNESS: 0
ABDOMINAL DISTENTION: 0
COUGH: 0
SHORTNESS OF BREATH: 0
ACTIVITY CHANGE: 0
DIARRHEA: 0
TROUBLE SWALLOWING: 0
WHEEZING: 0
ADENOPATHY: 0
VOMITING: 0
DIFFICULTY URINATING: 0
CONSTIPATION: 0
ARTHRALGIAS: 0
DIZZINESS: 0
PALPITATIONS: 0
LIGHT-HEADEDNESS: 0
FATIGUE: 0

## 2024-07-19 NOTE — PROGRESS NOTES
"Subjective   Patient ID: Radha Denson is a 68 y.o. female who presents for 6 MO LABS.    HPI   No headache, chest pain, shortness of breath, dizziness, lightheadedness, or edema  Had to stop Prolia due to cost of medicine (Plum was helping)  Getting Repatha due to financial assistance      Review of Systems   Constitutional:  Negative for activity change, appetite change, fatigue and unexpected weight change.   HENT:  Negative for ear pain, nosebleeds, rhinorrhea, sneezing and trouble swallowing.    Respiratory:  Negative for cough, shortness of breath and wheezing.    Cardiovascular:  Negative for chest pain, palpitations and leg swelling.   Gastrointestinal:  Negative for abdominal distention, abdominal pain, constipation, diarrhea, nausea and vomiting.   Genitourinary:  Negative for difficulty urinating.   Musculoskeletal:  Negative for arthralgias.   Skin:  Negative for rash.   Neurological:  Negative for dizziness, light-headedness, numbness and headaches.   Hematological:  Negative for adenopathy.   Psychiatric/Behavioral:  Negative for behavioral problems.    All other systems reviewed and are negative.      Objective   /80   Pulse 64   Ht 1.575 m (5' 2\")   Wt 72.5 kg (159 lb 14.4 oz)   SpO2 95%   BMI 29.25 kg/m²     Physical Exam  Vitals and nursing note reviewed.   Constitutional:       Appearance: Normal appearance.   HENT:      Head: Normocephalic and atraumatic.      Right Ear: Tympanic membrane, ear canal and external ear normal.      Left Ear: Tympanic membrane, ear canal and external ear normal.      Nose: Nose normal.      Mouth/Throat:      Mouth: Mucous membranes are moist.      Pharynx: Oropharynx is clear.   Cardiovascular:      Rate and Rhythm: Normal rate and regular rhythm.      Pulses: Normal pulses.      Heart sounds: Normal heart sounds.   Pulmonary:      Effort: Pulmonary effort is normal.      Breath sounds: Normal breath sounds.   Musculoskeletal:      Cervical back: " Normal range of motion and neck supple.   Neurological:      Mental Status: She is alert.   Psychiatric:         Mood and Affect: Mood normal.         Behavior: Behavior normal.         Assessment/Plan   Problem List Items Addressed This Visit             ICD-10-CM    Hypercholesterolemia E78.00     Tolerating Repatha and getting financial assistance         Relevant Orders    Comprehensive Metabolic Panel    Lipid Panel    Follow Up In Primary Care - Established    Primary hypertension - Primary I10     BP stable, labs normal         Relevant Orders    Comprehensive Metabolic Panel    Follow Up In Primary Care - Established    Osteoporosis M81.0     Unable to afford Prolia         Relevant Orders    Follow Up In Primary Care - Established    B12 deficiency E53.8    Relevant Orders    Follow Up In Primary Care - Established    Vitamin D deficiency E55.9    Relevant Orders    Follow Up In Primary Care - Established     Other Visit Diagnoses         Codes    Hypertension, essential, benign     I10    Relevant Orders    Comprehensive Metabolic Panel    Follow Up In Primary Care - Established    Routine general medical examination at health care facility     Z00.00    Relevant Orders    Follow Up In Primary Care - Established

## 2024-08-26 ENCOUNTER — TELEPHONE (OUTPATIENT)
Age: 69
End: 2024-08-26
Payer: MEDICARE

## 2024-08-26 ENCOUNTER — HOSPITAL ENCOUNTER (OUTPATIENT)
Dept: RADIOLOGY | Facility: EXTERNAL LOCATION | Age: 69
Discharge: HOME | End: 2024-08-26
Payer: MEDICARE

## 2024-08-26 DIAGNOSIS — Z12.31 BREAST CANCER SCREENING BY MAMMOGRAM: ICD-10-CM

## 2024-08-26 DIAGNOSIS — Z78.0 MENOPAUSE: ICD-10-CM

## 2024-08-26 DIAGNOSIS — Z12.31 BREAST CANCER SCREENING BY MAMMOGRAM: Primary | ICD-10-CM

## 2024-10-14 ENCOUNTER — TELEPHONE (OUTPATIENT)
Dept: PRIMARY CARE | Facility: CLINIC | Age: 69
End: 2024-10-14
Payer: MEDICARE

## 2024-10-14 NOTE — TELEPHONE ENCOUNTER
PATIENT WANTS REFERRED TO DR BOOGIE FOR PROLIA INJECTIONS.   CANNOT AFFORD TO HAVE THIS DONE AGAIN AT [facility],   OVER $ 6000.00    OK FOR REFERRAL  ?

## 2024-10-15 DIAGNOSIS — M81.0 AGE-RELATED OSTEOPOROSIS WITHOUT CURRENT PATHOLOGICAL FRACTURE: ICD-10-CM

## 2025-01-01 DIAGNOSIS — I10 PRIMARY HYPERTENSION: ICD-10-CM

## 2025-01-01 DIAGNOSIS — R12 HEARTBURN: ICD-10-CM

## 2025-01-22 ENCOUNTER — APPOINTMENT (OUTPATIENT)
Age: 70
End: 2025-01-22
Payer: MEDICARE

## 2025-01-22 VITALS
HEART RATE: 64 BPM | OXYGEN SATURATION: 95 % | HEIGHT: 62 IN | BODY MASS INDEX: 29.48 KG/M2 | DIASTOLIC BLOOD PRESSURE: 70 MMHG | WEIGHT: 160.2 LBS | SYSTOLIC BLOOD PRESSURE: 120 MMHG

## 2025-01-22 DIAGNOSIS — R10.13 DYSPEPSIA: ICD-10-CM

## 2025-01-22 DIAGNOSIS — Z12.11 COLON CANCER SCREENING: ICD-10-CM

## 2025-01-22 DIAGNOSIS — M81.0 AGE-RELATED OSTEOPOROSIS WITHOUT CURRENT PATHOLOGICAL FRACTURE: ICD-10-CM

## 2025-01-22 DIAGNOSIS — I10 PRIMARY HYPERTENSION: ICD-10-CM

## 2025-01-22 DIAGNOSIS — Z00.00 ROUTINE GENERAL MEDICAL EXAMINATION AT HEALTH CARE FACILITY: Primary | ICD-10-CM

## 2025-01-22 DIAGNOSIS — I10 HYPERTENSION, ESSENTIAL, BENIGN: ICD-10-CM

## 2025-01-22 DIAGNOSIS — E55.9 VITAMIN D DEFICIENCY: ICD-10-CM

## 2025-01-22 DIAGNOSIS — R12 HEARTBURN: ICD-10-CM

## 2025-01-22 DIAGNOSIS — E78.00 HYPERCHOLESTEROLEMIA: ICD-10-CM

## 2025-01-22 DIAGNOSIS — E53.8 B12 DEFICIENCY: ICD-10-CM

## 2025-01-22 PROCEDURE — 1170F FXNL STATUS ASSESSED: CPT | Performed by: FAMILY MEDICINE

## 2025-01-22 PROCEDURE — G0439 PPPS, SUBSEQ VISIT: HCPCS | Performed by: FAMILY MEDICINE

## 2025-01-22 PROCEDURE — 3008F BODY MASS INDEX DOCD: CPT | Performed by: FAMILY MEDICINE

## 2025-01-22 PROCEDURE — 3078F DIAST BP <80 MM HG: CPT | Performed by: FAMILY MEDICINE

## 2025-01-22 PROCEDURE — 1124F ACP DISCUSS-NO DSCNMKR DOCD: CPT | Performed by: FAMILY MEDICINE

## 2025-01-22 PROCEDURE — 1160F RVW MEDS BY RX/DR IN RCRD: CPT | Performed by: FAMILY MEDICINE

## 2025-01-22 PROCEDURE — 3074F SYST BP LT 130 MM HG: CPT | Performed by: FAMILY MEDICINE

## 2025-01-22 PROCEDURE — 1159F MED LIST DOCD IN RCRD: CPT | Performed by: FAMILY MEDICINE

## 2025-01-22 PROCEDURE — 1036F TOBACCO NON-USER: CPT | Performed by: FAMILY MEDICINE

## 2025-01-22 PROCEDURE — 99214 OFFICE O/P EST MOD 30 MIN: CPT | Performed by: FAMILY MEDICINE

## 2025-01-22 RX ORDER — FAMOTIDINE 20 MG/1
20 TABLET, FILM COATED ORAL 2 TIMES DAILY
Qty: 180 TABLET | Refills: 1 | Status: SHIPPED | OUTPATIENT
Start: 2025-01-22 | End: 2025-07-21

## 2025-01-22 RX ORDER — EVOLOCUMAB 140 MG/ML
140 INJECTION, SOLUTION SUBCUTANEOUS
Qty: 2 ML | Refills: 11 | Status: SHIPPED | OUTPATIENT
Start: 2025-01-22 | End: 2026-01-22

## 2025-01-22 RX ORDER — HYDROCHLOROTHIAZIDE 12.5 MG/1
TABLET ORAL
Qty: 90 TABLET | Refills: 3 | OUTPATIENT
Start: 2025-01-22

## 2025-01-22 RX ORDER — FAMOTIDINE 20 MG/1
20 TABLET, FILM COATED ORAL 2 TIMES DAILY
Qty: 60 TABLET | Refills: 5 | Status: SHIPPED | OUTPATIENT
Start: 2025-01-22 | End: 2025-01-22

## 2025-01-22 RX ORDER — LISINOPRIL 20 MG/1
20 TABLET ORAL DAILY
Qty: 90 TABLET | Refills: 3 | OUTPATIENT
Start: 2025-01-22

## 2025-01-22 RX ORDER — HYDROCHLOROTHIAZIDE 12.5 MG/1
12.5 TABLET ORAL
Qty: 90 TABLET | Refills: 3 | OUTPATIENT
Start: 2025-01-22

## 2025-01-22 RX ORDER — LISINOPRIL 20 MG/1
20 TABLET ORAL DAILY
Qty: 90 TABLET | Refills: 3 | Status: SHIPPED | OUTPATIENT
Start: 2025-01-22 | End: 2026-01-22

## 2025-01-22 RX ORDER — HYDROCHLOROTHIAZIDE 12.5 MG/1
12.5 TABLET ORAL
Qty: 90 TABLET | Refills: 3 | Status: SHIPPED | OUTPATIENT
Start: 2025-01-22 | End: 2026-01-22

## 2025-01-22 ASSESSMENT — ENCOUNTER SYMPTOMS
RHINORRHEA: 0
OCCASIONAL FEELINGS OF UNSTEADINESS: 0
LOSS OF SENSATION IN FEET: 0
ABDOMINAL DISTENTION: 0
ACTIVITY CHANGE: 0
APPETITE CHANGE: 0
HEADACHES: 0
DIFFICULTY URINATING: 0
DIARRHEA: 0
PALPITATIONS: 0
FATIGUE: 0
COUGH: 0
WHEEZING: 0
TROUBLE SWALLOWING: 0
NAUSEA: 1
NUMBNESS: 0
DEPRESSION: 0
ADENOPATHY: 0
ARTHRALGIAS: 0
VOMITING: 0
DIZZINESS: 0
CONSTIPATION: 0
SHORTNESS OF BREATH: 0
ABDOMINAL PAIN: 0
LIGHT-HEADEDNESS: 0
UNEXPECTED WEIGHT CHANGE: 0

## 2025-01-22 ASSESSMENT — ACTIVITIES OF DAILY LIVING (ADL)
GROCERY_SHOPPING: INDEPENDENT
DOING_HOUSEWORK: INDEPENDENT
DRESSING: INDEPENDENT
MANAGING_FINANCES: INDEPENDENT
BATHING: INDEPENDENT
TAKING_MEDICATION: INDEPENDENT

## 2025-01-22 ASSESSMENT — PATIENT HEALTH QUESTIONNAIRE - PHQ9
2. FEELING DOWN, DEPRESSED OR HOPELESS: NOT AT ALL
SUM OF ALL RESPONSES TO PHQ9 QUESTIONS 1 AND 2: 0
1. LITTLE INTEREST OR PLEASURE IN DOING THINGS: NOT AT ALL

## 2025-01-22 NOTE — PROGRESS NOTES
"Subjective   Reason for Visit: Paige Denson is an 69 y.o. female here for a Medicare Wellness visit.     Past Medical, Surgical, and Family History reviewed and updated in chart.    Reviewed all medications by prescribing practitioner or clinical pharmacist (such as prescriptions, OTCs, herbal therapies and supplements) and documented in the medical record.    HPI  No headache, chest pain, shortness of breath, dizziness, lightheadedness, or edema  Had to stop Prolia due to financial issues  Some nausea a few days after Repatha shot, no burping or belching at times, some indigestion at times    Patient Care Team:  Avery Cheema MD as PCP - General (Family Medicine)  Avery Cheema MD as PCP - [employer] PCP     Review of Systems   Constitutional:  Negative for activity change, appetite change, fatigue and unexpected weight change.   HENT:  Negative for ear pain, nosebleeds, rhinorrhea, sneezing and trouble swallowing.    Respiratory:  Negative for cough, shortness of breath and wheezing.    Cardiovascular:  Negative for chest pain, palpitations and leg swelling.   Gastrointestinal:  Positive for nausea. Negative for abdominal distention, abdominal pain, constipation, diarrhea and vomiting.   Genitourinary:  Negative for difficulty urinating.   Musculoskeletal:  Negative for arthralgias.   Skin:  Negative for rash.   Neurological:  Negative for dizziness, light-headedness, numbness and headaches.   Hematological:  Negative for adenopathy.   Psychiatric/Behavioral:  Negative for behavioral problems.    All other systems reviewed and are negative.      Objective   Vitals:  /70   Pulse 64   Ht 1.575 m (5' 2\")   Wt 72.7 kg (160 lb 3.2 oz)   SpO2 95%   BMI 29.30 kg/m²       Physical Exam  Vitals and nursing note reviewed.   Constitutional:       Appearance: Normal appearance.   HENT:      Head: Normocephalic and atraumatic.      Right Ear: Tympanic membrane, ear canal and external " ear normal.      Left Ear: Tympanic membrane, ear canal and external ear normal.      Nose: Nose normal.      Mouth/Throat:      Mouth: Mucous membranes are moist.      Pharynx: Oropharynx is clear.   Cardiovascular:      Rate and Rhythm: Normal rate and regular rhythm.      Pulses: Normal pulses.      Heart sounds: Normal heart sounds.   Pulmonary:      Effort: Pulmonary effort is normal.      Breath sounds: Normal breath sounds.   Abdominal:      General: Abdomen is flat. Bowel sounds are normal.      Palpations: Abdomen is soft.   Musculoskeletal:      Cervical back: Normal range of motion and neck supple.   Neurological:      Mental Status: She is alert.   Psychiatric:         Mood and Affect: Mood normal.         Behavior: Behavior normal.         Assessment & Plan  Hypercholesterolemia  Recommended using Repatha as directed, liver function testing normal, cholesterol levels elevated since not taking medication for the past month.  Orders:    Follow Up In Primary Care - Established    evolocumab (Repatha SureClick) 140 mg/mL injection; Inject 1 mL (140 mg) under the skin every 14 (fourteen) days.    Cholesterol, LDL Direct; Future    Comprehensive Metabolic Panel; Future    Hypertension, essential, benign    Orders:    Follow Up In Primary Care - Established    hydroCHLOROthiazide (Microzide) 12.5 mg tablet; Take 1 tablet (12.5 mg) by mouth once daily in the morning. Take before meals.    lisinopril 20 mg tablet; Take 1 tablet (20 mg) by mouth once daily.    Comprehensive Metabolic Panel; Future    Age-related osteoporosis without current pathological fracture  Has had trouble affording medications in the past, continue with calcium and vitamin D  Orders:    Follow Up In Primary Care - Established    Routine general medical examination at health care facility    Orders:    Follow Up In Primary Care - Established    Vitamin D deficiency    Orders:    Follow Up In Primary Care - Established    B12  deficiency    Orders:    Follow Up In Primary Care - Established    Primary hypertension  Blood pressures under good control renal and electrolytes stable, no change.  Orders:    Follow Up In Primary Care - Established    hydroCHLOROthiazide (Microzide) 12.5 mg tablet; Take 1 tablet (12.5 mg) by mouth once daily in the morning. Take before meals.    Heartburn    Orders:    lisinopril 20 mg tablet; Take 1 tablet (20 mg) by mouth once daily.    Colon cancer screening    Orders:    Colonoscopy Screening; Average Risk Patient; Future    Dyspepsia  History of acid refluxing, do not believe that it is related to the Repatha, start famotidine 20 mg twice a day, advised about over-the-counter medication call if not improving.    Orders:    famotidine (Pepcid) 20 mg tablet; Take 1 tablet (20 mg) by mouth 2 times a day.

## 2025-01-22 NOTE — ASSESSMENT & PLAN NOTE
Has had trouble affording medications in the past, continue with calcium and vitamin D  Orders:    Follow Up In Primary Care - Established

## 2025-01-22 NOTE — ASSESSMENT & PLAN NOTE
History of acid refluxing, do not believe that it is related to the Repatha, start famotidine 20 mg twice a day, advised about over-the-counter medication call if not improving.    Orders:    famotidine (Pepcid) 20 mg tablet; Take 1 tablet (20 mg) by mouth 2 times a day.

## 2025-01-22 NOTE — ASSESSMENT & PLAN NOTE
Recommended using Repatha as directed, liver function testing normal, cholesterol levels elevated since not taking medication for the past month.  Orders:    Follow Up In Primary Care - Established    evolocumab (Repatha SureClick) 140 mg/mL injection; Inject 1 mL (140 mg) under the skin every 14 (fourteen) days.    Cholesterol, LDL Direct; Future    Comprehensive Metabolic Panel; Future

## 2025-01-22 NOTE — ASSESSMENT & PLAN NOTE
Blood pressures under good control renal and electrolytes stable, no change.  Orders:    Follow Up In Primary Care - Established    hydroCHLOROthiazide (Microzide) 12.5 mg tablet; Take 1 tablet (12.5 mg) by mouth once daily in the morning. Take before meals.

## 2025-04-03 ENCOUNTER — OFFICE VISIT (OUTPATIENT)
Dept: URGENT CARE | Facility: CLINIC | Age: 70
End: 2025-04-03
Payer: MEDICARE

## 2025-04-03 VITALS
WEIGHT: 155 LBS | SYSTOLIC BLOOD PRESSURE: 127 MMHG | DIASTOLIC BLOOD PRESSURE: 85 MMHG | TEMPERATURE: 99.2 F | BODY MASS INDEX: 28.52 KG/M2 | OXYGEN SATURATION: 95 % | HEART RATE: 75 BPM | HEIGHT: 62 IN | RESPIRATION RATE: 14 BRPM

## 2025-04-03 DIAGNOSIS — U07.1 COVID-19: Primary | ICD-10-CM

## 2025-04-03 LAB
POC CORONAVIRUS 2019 BY PCR (COV19): DETECTED
POC RAPID INFLUENZA A: NEGATIVE
POC RAPID INFLUENZA B: NEGATIVE

## 2025-04-03 PROCEDURE — 99213 OFFICE O/P EST LOW 20 MIN: CPT | Performed by: NURSE PRACTITIONER

## 2025-04-03 PROCEDURE — 87804 INFLUENZA ASSAY W/OPTIC: CPT | Mod: QW | Performed by: NURSE PRACTITIONER

## 2025-04-03 RX ORDER — BENZONATATE 100 MG/1
100-200 CAPSULE ORAL EVERY 8 HOURS PRN
Qty: 40 CAPSULE | Refills: 0 | Status: SHIPPED | OUTPATIENT
Start: 2025-04-03

## 2025-04-03 NOTE — PROGRESS NOTES
[facility] URG[facility]  Nadeen Valles, APRN-CNP     Visit Note - 4/3/2025 1:15 PM   This note was generated with voice recognition software and may contain errors including spelling, grammar, syntax, and misrecognization of what was dictated.    Patient: Paige Denson, MRN: [id number], 69 y.o., female   PCP: Avery Cheema MD  ------------------------------------  ALLERGIES:   Allergies   Allergen Reactions    Atorvastatin Other     MYALGIA    Ezetimibe Other     MYALGIA    Pitavastatin Unknown    Statins-Hmg-Coa Reductase Inhibitors Other     MYALGIA      Sulfa (Sulfonamide Antibiotics) Rash    Sulfamethoxazole Rash        CURRENT MEDICATIONS:   Current Outpatient Medications   Medication Instructions    benzonatate (TESSALON) 100-200 mg, oral, Every 8 hours PRN, Do not crush or chew.    ergocalciferol (Vitamin D-2) 1.25 MG (21095 UT) capsule Take by mouth.    famotidine (PEPCID) 20 mg, oral, 2 times daily    hydroCHLOROthiazide (MICROZIDE) 12.5 mg, oral, Daily before breakfast    lisinopril 20 mg, oral, Daily    multivit-min-ferrous fumarate 15 mg iron tablet 1 tablet, Daily    Repatha SureClick 140 mg, subcutaneous, Every 14 days     ------------------------------------  PAST MEDICAL HX:  Patient Active Problem List   Diagnosis    Bradycardia    Hypercholesterolemia    Primary hypertension    Osteoporosis    B12 deficiency    Vitamin D deficiency    Dyspepsia      SURGICAL HX:  Past Surgical History:   Procedure Laterality Date    OTHER SURGICAL HISTORY  12/31/2019    Dilation and curettage    OTHER SURGICAL HISTORY  05/19/2022    Cholecystectomy laparoscopic    OTHER SURGICAL HISTORY  01/20/2020    Colonoscopy      FAMILY HX:   No pertinent history.   SOCIAL HX:    reports that she has never smoked. She has been exposed to tobacco smoke. She has never used smokeless tobacco. Retired - used to work at [employer].   ------------------------------------  CHIEF COMPLAINT:   Chief Complaint  "  Patient presents with    Flu Symptoms     Chills, body aches, diarrhea, fever, cough, congestion x 4 days      HISTORY OF PRESENT ILLNESS: The history was obtained from patientQue Gibson is a 69 y.o. female, who presents with a chief complaint of low-grade fevers/chills, body aches, intermittent diarrhea, a dry cough, headaches, intermittent nasal congestion, and mild nausea/stomachaches - sxs started on Monday. Denies any sore throat, ear pain, abdominal pain, chest pain, wheezing/shortness of breath, rashes, urinary symptoms, and vomiting. Denies any lightheadedness or dizziness; no changes in mental status. No swelling in legs. Appetite is decreased ; is able to eat and drink fluids without difficulty; denies loss of sense of taste or smell. Reports symptoms  have been waxing and waning since onset . Has been taking  OTC cold medications, ibuprofen, and pedialyte  without much relief; no other over-the-counter medications or home remedies for symptom management. No known ill contacts. Has not received the COVID vaccine. Has not received this season's influenza vaccine.. No known history of COVID infection.  Is a former smoker.  No known history of asthma/COPD/respiratory issues. No history of renal insufficiency or hepatic issues.     REVIEW OF SYSTEMS:  10 systems reviewed negative with exception of history of present illness as listed above.    TODAY'S VITALS: /85   Pulse 75   Temp 37.3 °C (99.2 °F)   Resp 14   Ht 1.575 m (5' 2\")   Wt 70.3 kg (155 lb)   SpO2 95%   BMI 28.35 kg/m²     PHYSICAL EXAMINATION:  General:  Mildly ill-appearing, well nourished female; alert and oriented; in no acute distress. Sitting comfortably on exam chair. Non-dyspneic.   Eyes:  Pupils equal, round and reactive to light. No conjunctival erythema; no scleral icterus.   HENT: No frontal or maxillary sinus tenderness; + mild audible nasal congestion. Airway patent, TMs and ear canals clear/unremarkable bilaterally. Nasal " mucosa mildly injected and edematous. Oral mucosa moist. Posterior pharynx mildly injected but without lesions or oropharyngeal exudate. Uvula is midline. Managing oral secretions without difficulty.  Neck:  Supple. Mildly tender, mobile anterior cervical lymphadenopathy bilat. Trachea is midline.  Respiratory:  Respirations easy and unlabored, Breath sounds equal. Lungs are clear to auscultation; no wheezes, rhonchi, or rales; has good air movement throughout. + non-productive cough noted. Non-dyspneic with ambulation; able to maintain SpO2.  Cardiovascular:  Normal rate, Regular rhythm. Normal S1S2. No m/r/g. No peripheral edema.   Gastrointestinal:  Soft, non-tender, non-distended; no palpable masses or organomegaly. Bowel sounds normoactive.  Musculoskeletal:  Grossly normal; appropriate for age.     Integumentary:  Pink, warm, dry, and intact. No rashes or skin discoloration appreciated. Good skin turgor.  Neurologic:  Alert and oriented, no gross deficits.    Cognition and Speech:  Oriented, Speech clear and coherent.    Psychiatric:  Cooperative, Appropriate mood & affect.       ------------------------------------  Medical Decision Making  LABORATORY or RADIOLOGICAL IMAGING ORDERS/RESULTS:   Influenza A/B: negative  COVID-19 PCR: POSITIVE - patient aware.     IMPRESSION/PLAN:  Course: Worsening; stable    1. COVID-19 (Primary)  - POCT Influenza A/B manually resulted  - POCT SARS-COV-2 PCR manually resulted  - benzonatate (Tessalon) 100 mg capsule; Take 1-2 capsules (100-200 mg) by mouth every 8 hours if needed for cough. Do not crush or chew.  Dispense: 40 capsule; Refill: 0  - Paxlovid    No red flags on exam today. COVID PCR test done in office - was positive. I have reviewed the  COVID-19 algorithm, and counseled pt on current CDC recommendations, including quarantine period, self care measures, etc. Discussed treatment options with patient. Patient requesting rx for Paxlovid. Medication sent to  pharmacy and encouraged to start ASAP. Will also start benzonatate for PRN use. Encouraged conservative measures - instructed to push fluids, rest, and to use appropriate over the counter medications as needed for management of symptoms. Should avoid NSAIDs, but can use Tylenol PRN. Reviewed instructions for self-isolation and continued monitoring. Reviewed red flags to monitor for, counseled on potential adverse reactions of treatments, expectations for improvement in sxs, and advised to follow-up with primary care provider in 3-5 days if symptoms persist, or to seek care sooner if worsening or if any additional concerns/red flags develop.  Patient agreed with plan of care; questions were encouraged and answered.       KEYONNA Lew-CNP   Advanced Practice Provider  [facility]

## 2025-05-22 NOTE — PATIENT INSTRUCTIONS
Start Augmentin 1 tablet twice a day x10 days with food.  Phone update on Wednesday.  If continues with discomfort on Wednesday we will check blood work and a CT of the abdomen and pelvis.  If pain worsens before Wednesday or is accompanied with fever chilling nausea vomiting or anorexia you need to be seen in the emergency room.  
yes

## 2025-07-25 ENCOUNTER — APPOINTMENT (OUTPATIENT)
Age: 70
End: 2025-07-25
Payer: MEDICARE

## 2025-07-25 VITALS
BODY MASS INDEX: 29.15 KG/M2 | HEART RATE: 69 BPM | HEIGHT: 62 IN | SYSTOLIC BLOOD PRESSURE: 120 MMHG | DIASTOLIC BLOOD PRESSURE: 80 MMHG | WEIGHT: 158.4 LBS | OXYGEN SATURATION: 94 %

## 2025-07-25 DIAGNOSIS — Z12.31 BREAST CANCER SCREENING BY MAMMOGRAM: ICD-10-CM

## 2025-07-25 DIAGNOSIS — E78.00 HYPERCHOLESTEROLEMIA: Primary | ICD-10-CM

## 2025-07-25 DIAGNOSIS — I10 PRIMARY HYPERTENSION: ICD-10-CM

## 2025-07-25 DIAGNOSIS — M81.0 AGE-RELATED OSTEOPOROSIS WITHOUT CURRENT PATHOLOGICAL FRACTURE: ICD-10-CM

## 2025-07-25 PROCEDURE — 1036F TOBACCO NON-USER: CPT | Performed by: FAMILY MEDICINE

## 2025-07-25 PROCEDURE — 1160F RVW MEDS BY RX/DR IN RCRD: CPT | Performed by: FAMILY MEDICINE

## 2025-07-25 PROCEDURE — 1159F MED LIST DOCD IN RCRD: CPT | Performed by: FAMILY MEDICINE

## 2025-07-25 PROCEDURE — G2211 COMPLEX E/M VISIT ADD ON: HCPCS | Performed by: FAMILY MEDICINE

## 2025-07-25 PROCEDURE — 3079F DIAST BP 80-89 MM HG: CPT | Performed by: FAMILY MEDICINE

## 2025-07-25 PROCEDURE — 3008F BODY MASS INDEX DOCD: CPT | Performed by: FAMILY MEDICINE

## 2025-07-25 PROCEDURE — 99213 OFFICE O/P EST LOW 20 MIN: CPT | Performed by: FAMILY MEDICINE

## 2025-07-25 PROCEDURE — 3074F SYST BP LT 130 MM HG: CPT | Performed by: FAMILY MEDICINE

## 2025-07-25 ASSESSMENT — ENCOUNTER SYMPTOMS
HEADACHES: 0
ABDOMINAL PAIN: 0
NAUSEA: 0
ACTIVITY CHANGE: 0
NUMBNESS: 0
FATIGUE: 0
TROUBLE SWALLOWING: 0
DIZZINESS: 0
ABDOMINAL DISTENTION: 0
RHINORRHEA: 0
PALPITATIONS: 0
COUGH: 0
ARTHRALGIAS: 0
ADENOPATHY: 0
APPETITE CHANGE: 0
UNEXPECTED WEIGHT CHANGE: 0
DIFFICULTY URINATING: 0
SHORTNESS OF BREATH: 0
CONSTIPATION: 0
VOMITING: 0
LIGHT-HEADEDNESS: 0
WHEEZING: 0
DIARRHEA: 0

## 2025-07-25 NOTE — ASSESSMENT & PLAN NOTE
Tolerating Repatha injection, LDL cholesterol much improved from prior, continue with current medication, had trouble tolerating statins.

## 2025-07-25 NOTE — PROGRESS NOTES
"Subjective   Patient ID: Radha Denson is a 69 y.o. female who presents for 6 MO LABS.    HPI   No headache, chest pain, shortness of breath, dizziness, lightheadedness, or edema  Had COVID in April, getting better    Review of Systems   Constitutional:  Negative for activity change, appetite change, fatigue and unexpected weight change.   HENT:  Negative for ear pain, nosebleeds, rhinorrhea, sneezing and trouble swallowing.    Respiratory:  Negative for cough, shortness of breath and wheezing.    Cardiovascular:  Negative for chest pain, palpitations and leg swelling.   Gastrointestinal:  Negative for abdominal distention, abdominal pain, constipation, diarrhea, nausea and vomiting.   Genitourinary:  Negative for difficulty urinating.   Musculoskeletal:  Negative for arthralgias.   Skin:  Negative for rash.   Neurological:  Negative for dizziness, light-headedness, numbness and headaches.   Hematological:  Negative for adenopathy.   Psychiatric/Behavioral:  Negative for behavioral problems.    All other systems reviewed and are negative.      Objective   /80   Pulse 69   Ht 1.575 m (5' 2\")   Wt 71.8 kg (158 lb 6.4 oz)   SpO2 94%   BMI 28.97 kg/m²     Physical Exam  Vitals and nursing note reviewed.   Constitutional:       Appearance: Normal appearance.   HENT:      Head: Normocephalic and atraumatic.      Right Ear: Tympanic membrane, ear canal and external ear normal.      Left Ear: Tympanic membrane, ear canal and external ear normal.      Nose: Nose normal.      Mouth/Throat:      Mouth: Mucous membranes are moist.      Pharynx: Oropharynx is clear.     Cardiovascular:      Rate and Rhythm: Normal rate and regular rhythm.      Pulses: Normal pulses.      Heart sounds: Normal heart sounds.   Pulmonary:      Effort: Pulmonary effort is normal.      Breath sounds: Normal breath sounds.     Musculoskeletal:      Cervical back: Normal range of motion and neck supple.     Skin:     General: Skin is warm " and dry.      Capillary Refill: Capillary refill takes less than 2 seconds.     Neurological:      Mental Status: She is alert.     Psychiatric:         Mood and Affect: Mood normal.         Behavior: Behavior normal.         Assessment/Plan   Problem List Items Addressed This Visit           ICD-10-CM    Hypercholesterolemia - Primary E78.00    Tolerating Repatha injection, LDL cholesterol much improved from prior, continue with current medication, had trouble tolerating statins.         Relevant Orders    Follow Up In Primary Care - Established    Comprehensive Metabolic Panel    Lipid Panel    Primary hypertension I10    Blood pressure is stable currently, continue with current medications, electrolyte and renal functions are normal         Relevant Orders    Follow Up In Primary Care - Established    Comprehensive Metabolic Panel    Osteoporosis M81.0    Has had trouble affording medications in the past, continue with calcium and vitamin D  SmartLink not supported outside of the Encounter Diagnoses SmartSection.           Relevant Orders    Follow Up In Primary Care - Established     Other Visit Diagnoses         Codes      Breast cancer screening by mammogram     Z12.31    Relevant Orders    BI mammo bilateral screening tomosynthesis

## 2025-07-25 NOTE — ASSESSMENT & PLAN NOTE
Has had trouble affording medications in the past, continue with calcium and vitamin D  SmartLink not supported outside of the Encounter Diagnoses SmartSection.

## 2025-07-25 NOTE — ASSESSMENT & PLAN NOTE
Blood pressure is stable currently, continue with current medications, electrolyte and renal functions are normal

## 2026-01-28 ENCOUNTER — APPOINTMENT (OUTPATIENT)
Age: 71
End: 2026-01-28
Payer: MEDICARE